# Patient Record
Sex: FEMALE | URBAN - METROPOLITAN AREA
[De-identification: names, ages, dates, MRNs, and addresses within clinical notes are randomized per-mention and may not be internally consistent; named-entity substitution may affect disease eponyms.]

---

## 2021-04-07 ENCOUNTER — TELEPHONE ENCOUNTER (OUTPATIENT)
Dept: URBAN - METROPOLITAN AREA CLINIC 35 | Facility: CLINIC | Age: 46
End: 2021-04-07

## 2021-04-07 ENCOUNTER — OFFICE VISIT (OUTPATIENT)
Dept: URBAN - METROPOLITAN AREA CLINIC 35 | Facility: CLINIC | Age: 46
End: 2021-04-07

## 2021-04-07 VITALS
SYSTOLIC BLOOD PRESSURE: 108 MMHG | WEIGHT: 193 LBS | HEIGHT: 62 IN | TEMPERATURE: 98.3 F | HEART RATE: 74 BPM | OXYGEN SATURATION: 97 % | DIASTOLIC BLOOD PRESSURE: 74 MMHG | BODY MASS INDEX: 35.51 KG/M2

## 2021-04-07 PROBLEM — 2901004 MELENA: Status: ACTIVE | Noted: 2021-04-07

## 2021-04-07 PROBLEM — 301716002 LEFT LOWER QUADRANT PAIN: Status: ACTIVE | Noted: 2021-04-07

## 2021-04-07 RX ORDER — PHENTERMINE HYDROCHLORIDE 15 MG/1
1 CAPSULE CAPSULE ORAL ONCE A DAY
Status: ACTIVE | COMMUNITY

## 2021-04-07 RX ORDER — LEVOTHYROXINE SODIUM 88 UG/1
1 TABLET IN THE MORNING ON AN EMPTY STOMACH TABLET ORAL ONCE A DAY
Qty: 30 | Status: ACTIVE | COMMUNITY

## 2021-04-07 RX ORDER — SODIUM PICOSULFATE, MAGNESIUM OXIDE, AND ANHYDROUS CITRIC ACID 10; 3.5; 12 MG/160ML; G/160ML; G/160ML
AS DIRECTED LIQUID ORAL
Qty: 1 KIT | OUTPATIENT
Start: 2021-04-07

## 2021-04-07 RX ORDER — FAMOTIDINE 20 MG/1
1 TABLET AT BEDTIME AS NEEDED TABLET, FILM COATED ORAL ONCE A DAY
Qty: 30 | Status: ACTIVE | COMMUNITY

## 2021-04-07 RX ORDER — ASCORBIC ACID 125 MG
AS DIRECTED TABLET,CHEWABLE ORAL
Status: ACTIVE | COMMUNITY

## 2021-04-07 RX ORDER — MULTIVITAMIN WITH IRON
AS DIRECTED TABLET ORAL
Status: ACTIVE | COMMUNITY

## 2021-04-07 RX ORDER — DEXTROMETHORPHAN POLISTIREX 30 MG/5 ML
1 CAPSULE SUSPENSION, EXTENDED RELEASE 12 HR ORAL ONCE A DAY
Qty: 30 | Status: ACTIVE | COMMUNITY

## 2021-04-07 NOTE — EXAM-MIGRATED EXAMINATIONS
General Examination : Medical assistant was in the room during the examination.;     GENERAL APPEARANCE: - alert, in no acute distress, well developed, well nourished;   HEAD: - normocephalic, atraumatic;   ORAL CAVITY: - mucosa moist;   THROAT: - Mallampati classII;   HEART: - no murmurs, regular rate and rhythm, S1, S2 normal;   LUNGS: - clear to auscultation bilaterally, good air movement, no wheezes, rales, rhonchi;   ABDOMEN: - bowel sounds present, no masses palpable, no organomegaly , no rebound tenderness, soft, nontender, nondistended;   RECTAL: - normal tone, no external hemorrhoids, no masses palpable, no red blood;

## 2021-04-07 NOTE — HPI-MIGRATED HPI
;   ;     Rectal Bleeding : 46 y/o female has been referred to our office for a consultation for rectal bleeding by JARETT Olmos. Patient admits symptoms began  and describes symptoms as intermittent and when she walks for long period of time she will have rectal bleeding that is bright red in color and its dripping .   Currently patient reports 1 bowel movement a day without strain. Her stools are normal .   she does admit external growth possible hemorrhoid and intermittent rectal itching .  She denies any recent stool studies.   Patient denies that she has tried  any OTC medications for relief. ;   Abdominal Pain : patient presents for abdominal pain consult. Patient admits she has been experiencing symptoms for more than 1 year.  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 After she had oophrectomy pain persisted . Patient describes symptoms as sharp left lower qadrant pain . Patient admits symptoms are more present during the night after lying down for long period of time . She states pain radiates to her left leg .   Patient admits intermittent  nausea when pain is present .  Patient denies having any recent imaging.  Patient denies EGD in the past.   ;

## 2021-04-13 ENCOUNTER — OFFICE VISIT (OUTPATIENT)
Dept: URBAN - METROPOLITAN AREA SURGERY CENTER 8 | Facility: SURGERY CENTER | Age: 46
End: 2021-04-13

## 2021-04-28 ENCOUNTER — OFFICE VISIT (OUTPATIENT)
Dept: URBAN - METROPOLITAN AREA CLINIC 35 | Facility: CLINIC | Age: 46
End: 2021-04-28